# Patient Record
Sex: FEMALE | Race: WHITE | NOT HISPANIC OR LATINO | URBAN - METROPOLITAN AREA
[De-identification: names, ages, dates, MRNs, and addresses within clinical notes are randomized per-mention and may not be internally consistent; named-entity substitution may affect disease eponyms.]

---

## 2023-03-08 DIAGNOSIS — F32.0 DEPRESSION, MAJOR, SINGLE EPISODE, MILD (CMS-HCC): ICD-10-CM

## 2023-03-08 RX ORDER — SERTRALINE HYDROCHLORIDE 50 MG/1
50 TABLET, FILM COATED ORAL DAILY
Qty: 90 TABLET | Refills: 1 | Status: SHIPPED | OUTPATIENT
Start: 2023-03-08 | End: 2023-09-11 | Stop reason: SDUPTHER

## 2023-03-08 RX ORDER — SERTRALINE HYDROCHLORIDE 50 MG/1
50 TABLET, FILM COATED ORAL DAILY
COMMUNITY
End: 2023-03-08 | Stop reason: SDUPTHER

## 2023-03-22 PROBLEM — R03.0 ELEVATED BLOOD PRESSURE, SITUATIONAL: Status: ACTIVE | Noted: 2023-03-22

## 2023-03-22 PROBLEM — E66.01 CLASS 2 SEVERE OBESITY WITH SERIOUS COMORBIDITY AND BODY MASS INDEX (BMI) OF 38.0 TO 38.9 IN ADULT (MULTI): Status: ACTIVE | Noted: 2023-03-22

## 2023-03-22 PROBLEM — Z85.3 HISTORY OF BREAST CANCER: Status: ACTIVE | Noted: 2023-03-22

## 2023-03-22 PROBLEM — M25.532 LEFT WRIST PAIN: Status: ACTIVE | Noted: 2023-03-22

## 2023-03-22 PROBLEM — M17.11 PRIMARY OSTEOARTHRITIS OF RIGHT KNEE: Status: ACTIVE | Noted: 2023-03-22

## 2023-03-22 PROBLEM — M25.579 ANKLE PAIN: Status: ACTIVE | Noted: 2023-03-22

## 2023-03-22 PROBLEM — M25.561 RIGHT KNEE PAIN: Status: ACTIVE | Noted: 2023-03-22

## 2023-03-22 PROBLEM — C50.919 BREAST CA (MULTI): Status: ACTIVE | Noted: 2023-03-22

## 2023-03-22 PROBLEM — M77.50 ANKLE TENDINITIS: Status: ACTIVE | Noted: 2023-03-22

## 2023-03-22 PROBLEM — E11.9 TYPE 2 DIABETES MELLITUS WITHOUT COMPLICATION, WITHOUT LONG-TERM CURRENT USE OF INSULIN (MULTI): Status: ACTIVE | Noted: 2023-03-22

## 2023-03-22 RX ORDER — MELOXICAM 15 MG/1
1 TABLET ORAL DAILY PRN
COMMUNITY
Start: 2022-08-31 | End: 2023-12-07

## 2023-03-22 RX ORDER — ATORVASTATIN CALCIUM 40 MG/1
1 TABLET, FILM COATED ORAL NIGHTLY
COMMUNITY
Start: 2022-09-07 | End: 2023-09-11 | Stop reason: ALTCHOICE

## 2023-03-22 RX ORDER — OMEPRAZOLE 20 MG/1
1 TABLET, DELAYED RELEASE ORAL DAILY
COMMUNITY

## 2023-03-22 RX ORDER — METFORMIN HYDROCHLORIDE 1000 MG/1
1000 TABLET ORAL DAILY
COMMUNITY
Start: 2022-10-24 | End: 2023-09-11 | Stop reason: SDUPTHER

## 2023-04-10 ENCOUNTER — APPOINTMENT (OUTPATIENT)
Dept: PRIMARY CARE | Facility: CLINIC | Age: 63
End: 2023-04-10
Payer: COMMERCIAL

## 2023-09-11 ENCOUNTER — OFFICE VISIT (OUTPATIENT)
Dept: PRIMARY CARE | Facility: CLINIC | Age: 63
End: 2023-09-11
Payer: COMMERCIAL

## 2023-09-11 ENCOUNTER — LAB (OUTPATIENT)
Dept: LAB | Facility: LAB | Age: 63
End: 2023-09-11
Payer: COMMERCIAL

## 2023-09-11 VITALS
BODY MASS INDEX: 34.32 KG/M2 | HEIGHT: 65 IN | RESPIRATION RATE: 18 BRPM | SYSTOLIC BLOOD PRESSURE: 152 MMHG | TEMPERATURE: 98.1 F | OXYGEN SATURATION: 97 % | DIASTOLIC BLOOD PRESSURE: 90 MMHG | HEART RATE: 97 BPM | WEIGHT: 206 LBS

## 2023-09-11 DIAGNOSIS — E11.9 TYPE 2 DIABETES MELLITUS WITHOUT COMPLICATION, WITHOUT LONG-TERM CURRENT USE OF INSULIN (MULTI): Primary | ICD-10-CM

## 2023-09-11 DIAGNOSIS — E11.9 TYPE 2 DIABETES MELLITUS WITHOUT COMPLICATION, WITHOUT LONG-TERM CURRENT USE OF INSULIN (MULTI): ICD-10-CM

## 2023-09-11 DIAGNOSIS — Z12.11 ENCOUNTER FOR SCREENING FOR MALIGNANT NEOPLASM OF COLON: ICD-10-CM

## 2023-09-11 DIAGNOSIS — F32.0 DEPRESSION, MAJOR, SINGLE EPISODE, MILD (CMS-HCC): ICD-10-CM

## 2023-09-11 PROBLEM — F41.8 ANXIETY WITH DEPRESSION: Status: ACTIVE | Noted: 2023-09-11

## 2023-09-11 PROBLEM — E55.9 VITAMIN D DEFICIENCY: Status: ACTIVE | Noted: 2023-09-11

## 2023-09-11 PROBLEM — G62.9 NEUROPATHY: Status: RESOLVED | Noted: 2023-09-11 | Resolved: 2023-09-11

## 2023-09-11 LAB
ALANINE AMINOTRANSFERASE (SGPT) (U/L) IN SER/PLAS: 23 U/L (ref 7–45)
ALBUMIN (G/DL) IN SER/PLAS: 4.3 G/DL (ref 3.4–5)
ALKALINE PHOSPHATASE (U/L) IN SER/PLAS: 60 U/L (ref 33–136)
ANION GAP IN SER/PLAS: 13 MMOL/L (ref 10–20)
ASPARTATE AMINOTRANSFERASE (SGOT) (U/L) IN SER/PLAS: 23 U/L (ref 9–39)
BILIRUBIN TOTAL (MG/DL) IN SER/PLAS: 0.5 MG/DL (ref 0–1.2)
CALCIUM (MG/DL) IN SER/PLAS: 9.7 MG/DL (ref 8.6–10.3)
CARBON DIOXIDE, TOTAL (MMOL/L) IN SER/PLAS: 28 MMOL/L (ref 21–32)
CHLORIDE (MMOL/L) IN SER/PLAS: 101 MMOL/L (ref 98–107)
CHOLESTEROL (MG/DL) IN SER/PLAS: 201 MG/DL (ref 0–199)
CHOLESTEROL IN HDL (MG/DL) IN SER/PLAS: 48.2 MG/DL
CHOLESTEROL/HDL RATIO: 4.2
CREATININE (MG/DL) IN SER/PLAS: 0.76 MG/DL (ref 0.5–1.05)
ERYTHROCYTE DISTRIBUTION WIDTH (RATIO) BY AUTOMATED COUNT: 13.3 % (ref 11.5–14.5)
ERYTHROCYTE MEAN CORPUSCULAR HEMOGLOBIN CONCENTRATION (G/DL) BY AUTOMATED: 31.7 G/DL (ref 32–36)
ERYTHROCYTE MEAN CORPUSCULAR VOLUME (FL) BY AUTOMATED COUNT: 89 FL (ref 80–100)
ERYTHROCYTES (10*6/UL) IN BLOOD BY AUTOMATED COUNT: 4.88 X10E12/L (ref 4–5.2)
GFR FEMALE: 88 ML/MIN/1.73M2
GLUCOSE (MG/DL) IN SER/PLAS: 180 MG/DL (ref 74–99)
HEMATOCRIT (%) IN BLOOD BY AUTOMATED COUNT: 43.5 % (ref 36–46)
HEMOGLOBIN (G/DL) IN BLOOD: 13.8 G/DL (ref 12–16)
LDL: 115 MG/DL (ref 0–99)
LEUKOCYTES (10*3/UL) IN BLOOD BY AUTOMATED COUNT: 4.4 X10E9/L (ref 4.4–11.3)
PLATELETS (10*3/UL) IN BLOOD AUTOMATED COUNT: 200 X10E9/L (ref 150–450)
POC HEMOGLOBIN A1C: 9.8 % (ref 4.2–6.5)
POTASSIUM (MMOL/L) IN SER/PLAS: 4.4 MMOL/L (ref 3.5–5.3)
PROTEIN TOTAL: 7.4 G/DL (ref 6.4–8.2)
SODIUM (MMOL/L) IN SER/PLAS: 138 MMOL/L (ref 136–145)
TRIGLYCERIDE (MG/DL) IN SER/PLAS: 187 MG/DL (ref 0–149)
UREA NITROGEN (MG/DL) IN SER/PLAS: 14 MG/DL (ref 6–23)
VLDL: 37 MG/DL (ref 0–40)

## 2023-09-11 PROCEDURE — 80061 LIPID PANEL: CPT

## 2023-09-11 PROCEDURE — 82570 ASSAY OF URINE CREATININE: CPT

## 2023-09-11 PROCEDURE — 36415 COLL VENOUS BLD VENIPUNCTURE: CPT

## 2023-09-11 PROCEDURE — 3080F DIAST BP >= 90 MM HG: CPT

## 2023-09-11 PROCEDURE — 3077F SYST BP >= 140 MM HG: CPT

## 2023-09-11 PROCEDURE — 83036 HEMOGLOBIN GLYCOSYLATED A1C: CPT

## 2023-09-11 PROCEDURE — 99396 PREV VISIT EST AGE 40-64: CPT

## 2023-09-11 PROCEDURE — 82043 UR ALBUMIN QUANTITATIVE: CPT

## 2023-09-11 PROCEDURE — 80053 COMPREHEN METABOLIC PANEL: CPT

## 2023-09-11 PROCEDURE — 85027 COMPLETE CBC AUTOMATED: CPT

## 2023-09-11 PROCEDURE — 1036F TOBACCO NON-USER: CPT

## 2023-09-11 RX ORDER — INSULIN PUMP SYRINGE, 3 ML
1 EACH MISCELLANEOUS 3 TIMES DAILY
Qty: 1 EACH | Refills: 0 | Status: SHIPPED | OUTPATIENT
Start: 2023-09-11 | End: 2024-09-10

## 2023-09-11 RX ORDER — PRAVASTATIN SODIUM 40 MG/1
40 TABLET ORAL DAILY
Qty: 30 TABLET | Refills: 11 | Status: SHIPPED | OUTPATIENT
Start: 2023-09-11 | End: 2024-09-10

## 2023-09-11 RX ORDER — SERTRALINE HYDROCHLORIDE 50 MG/1
50 TABLET, FILM COATED ORAL DAILY
Qty: 360 TABLET | Refills: 0 | Status: SHIPPED | OUTPATIENT
Start: 2023-09-11 | End: 2024-05-28 | Stop reason: SDUPTHER

## 2023-09-11 RX ORDER — BLOOD SUGAR DIAGNOSTIC
1 STRIP MISCELLANEOUS 3 TIMES DAILY
Qty: 460 STRIP | Refills: 0 | Status: SHIPPED | OUTPATIENT
Start: 2023-09-11 | End: 2023-09-12

## 2023-09-11 RX ORDER — LANCETS
1 EACH MISCELLANEOUS
Qty: 100 EACH | Refills: 3 | Status: SHIPPED | OUTPATIENT
Start: 2023-09-11 | End: 2024-03-09

## 2023-09-11 RX ORDER — METFORMIN HYDROCHLORIDE 1000 MG/1
1000 TABLET ORAL
Qty: 180 TABLET | Refills: 0 | Status: SHIPPED | OUTPATIENT
Start: 2023-09-11 | End: 2024-02-06 | Stop reason: SDUPTHER

## 2023-09-11 ASSESSMENT — ENCOUNTER SYMPTOMS
NERVOUS/ANXIOUS: 0
DIARRHEA: 0
SLEEP DISTURBANCE: 0
CHILLS: 0
DIFFICULTY URINATING: 0
SHORTNESS OF BREATH: 0
COUGH: 0
ABDOMINAL PAIN: 0
ACTIVITY CHANGE: 0
WEAKNESS: 0
PALPITATIONS: 0
APPETITE CHANGE: 0
CONSTIPATION: 0
ARTHRALGIAS: 1
RHINORRHEA: 0
FEVER: 0
HEADACHES: 0

## 2023-09-11 NOTE — PATIENT INSTRUCTIONS
It was nice seeing you in the office today.  Sign up for [facility] to get results instantly.  Increase metform in 2000mg night, statin at night as well  Sugars at least 2 times/day , one fasting.  BP check 2-3x/week.    Obtain labs.  Follow up 4-6 weeks.  Call the office: [phone] for questions or concerns.  Please allow 48-72 hours for medication refills.

## 2023-09-11 NOTE — PROGRESS NOTES
Subjective   Magi Bishop is a 63 y.o. female who presents for Med Refill (Medication refills.) and Follow-up (Follow up DM).  Med Refill  Associated symptoms include arthralgias (knees). Pertinent negatives include no abdominal pain, chest pain, chills, congestion, coughing, fever, headaches or weakness.     No concerns today.    Last physical: last year  Last blood work: last year.  Current meds: Reviewed and UTD.  Immunizations: UTD on Tdap, and shingles.    Tobacco screen: never.    Diet: Veggies, smaller portions. Limiting carbs.  Exercise: life works, walking with .    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    Colonoscopy: never done.  Mammogram: UTD Pap: UTD.  Post menopausal.  S/p Chemo, radiation, lumpectomy for breast cancer.    Active Problem List  Depression - controlled with Sertraline.  T2DM - uncontrolled - metformin.  Right OA - controlled with Excedrin.    Comprehensive Medical/Surgical/Social/Family History  Past Medical History:   Diagnosis Date    Personal history of malignant neoplasm of breast 02/07/2022    History of malignant neoplasm of breast     Past Surgical History:   Procedure Laterality Date    OTHER SURGICAL HISTORY  02/07/2022    Wrist fracture repair    OTHER SURGICAL HISTORY  02/07/2022    Breast surgery     Social History     Social History Narrative    Not on file     Allergies and Medications  Patient has no known allergies.  Current Outpatient Medications on File Prior to Visit   Medication Sig Dispense Refill    meloxicam (Mobic) 15 mg tablet Take 1 tablet (15 mg) by mouth once daily as needed.      omeprazole OTC (PriLOSEC OTC) 20 mg EC tablet Take 1 tablet (20 mg) by mouth once daily.      [DISCONTINUED] metFORMIN (Glucophage) 1,000 mg tablet Take 1 tablet (1,000 mg) by mouth once daily. Take with food.      [DISCONTINUED] sertraline (Zoloft) 50 mg tablet Take 1 tablet (50 mg) by mouth once daily. 90 tablet 1     "[DISCONTINUED] atorvastatin (Lipitor) 40 mg tablet Take 1 tablet (40 mg) by mouth once daily at bedtime.       No current facility-administered medications on file prior to visit.     Review of Systems   Constitutional:  Negative for activity change, appetite change, chills and fever.   HENT:  Negative for congestion and rhinorrhea.    Eyes:  Negative for visual disturbance.   Respiratory:  Negative for cough and shortness of breath.    Cardiovascular:  Negative for chest pain, palpitations and leg swelling.   Gastrointestinal:  Negative for abdominal pain, constipation and diarrhea.   Endocrine: Negative for polyuria.   Genitourinary:  Negative for difficulty urinating.   Musculoskeletal:  Positive for arthralgias (knees).   Neurological:  Negative for weakness and headaches.   Psychiatric/Behavioral:  Negative for sleep disturbance. The patient is not nervous/anxious.      /90 (BP Location: Right arm, Patient Position: Sitting)   Pulse 97   Temp 36.7 °C (98.1 °F)   Resp 18   Ht 1.651 m (5' 5\")   Wt 93.4 kg (206 lb)   SpO2 97%   BMI 34.28 kg/m²     Objective     Physical Exam  Vitals reviewed.   Constitutional:       General: She is not in acute distress.     Appearance: Normal appearance. She is obese.   HENT:      Head: Normocephalic and atraumatic.      Right Ear: Tympanic membrane, ear canal and external ear normal.      Left Ear: Tympanic membrane, ear canal and external ear normal.      Mouth/Throat:      Mouth: Mucous membranes are moist.      Pharynx: Oropharynx is clear.   Eyes:      Extraocular Movements: Extraocular movements intact.      Pupils: Pupils are equal, round, and reactive to light.   Cardiovascular:      Rate and Rhythm: Normal rate and regular rhythm.      Pulses: Normal pulses.      Heart sounds: Normal heart sounds. No murmur heard.  Pulmonary:      Effort: Pulmonary effort is normal.      Breath sounds: Normal breath sounds. No wheezing, rhonchi or rales.   Abdominal:      " General: Bowel sounds are normal.      Palpations: Abdomen is soft.      Tenderness: There is no abdominal tenderness. There is no guarding or rebound.   Musculoskeletal:         General: Normal range of motion.      Cervical back: Normal range of motion and neck supple. No tenderness.      Right lower leg: No edema.      Left lower leg: No edema.   Skin:     General: Skin is warm and dry.      Capillary Refill: Capillary refill takes less than 2 seconds.   Neurological:      General: No focal deficit present.      Mental Status: She is alert.      Cranial Nerves: No cranial nerve deficit.      Motor: No weakness.      Gait: Gait normal.      Deep Tendon Reflexes: Reflexes normal.   Psychiatric:         Mood and Affect: Mood normal.       Assessment/Plan   Problem List Items Addressed This Visit       Depression, major, single episode, mild (CMS/HCC)    Relevant Medications    sertraline (Zoloft) 50 mg tablet    Type 2 diabetes mellitus without complication, without long-term current use of insulin (CMS/HCC) - Primary    Relevant Medications    metFORMIN (Glucophage) 1,000 mg tablet    FreeStyle glucose monitoring kit    blood sugar diagnostic (FreeStyle Test) strip    lancets misc    pravastatin (Pravachol) 40 mg tablet    Other Relevant Orders    POCT glycosylated hemoglobin (Hb A1C) manually resulted (Completed)    CBC    Comprehensive metabolic panel    Lipid panel    Albumin, urine, random     Other Visit Diagnoses       Encounter for screening for malignant neoplasm of colon        Relevant Orders    Colonoscopy Screening        Pleasant 63-year-old female here for physical.  She has active problems of type 2 diabetes as currently uncontrolled on metformin 1000 mg at night.  Discussed maximizing at 2000 mg at night as well as secondary agent, however in shared decision making, patient would like to trial 2000 mg and check sugars and work on diet before considering another agent.  She does have an ASCVD risk  score of 11 and pravastatin 40 was added to her regimen.  Depression is currently controlled with Zoloft 2 mg at this time.  Her blood pressure was elevated in the office today, she does have a history of situational elevated blood pressure.  She is not checking blood pressures at home, would recommend checking blood pressures 2-3 times per week and following up with logs in 4 to 6 weeks.  Lab work and screenings ordered per above.  Immunizations up-to-date.  Recommend flu shot.  Recommend:  Seeing dentist twice a year.  Getting eyes checked yearly.  30 minutes of moderate intensity exercise 5x/week.  Application of sun screen every 2 hours during peak times.  Regular sleep schedule, at least 8 hours of sleep per night, limiting TV/screens 30 minutes prior to bedtime.    Discussed with Attending,    Siena Sheffield, DO PGY-3

## 2023-09-12 LAB
ALBUMIN (MG/L) IN URINE: 25.9 MG/L
ALBUMIN/CREATININE (UG/MG) IN URINE: 13 UG/MG CRT (ref 0–30)
CREATININE (MG/DL) IN URINE: 199 MG/DL (ref 20–320)

## 2023-09-12 RX ORDER — BLOOD SUGAR DIAGNOSTIC
1 STRIP MISCELLANEOUS 4 TIMES DAILY
Qty: 460 STRIP | Refills: 0 | Status: SHIPPED | OUTPATIENT
Start: 2023-09-12 | End: 2024-03-10

## 2023-09-12 NOTE — PROGRESS NOTES
I reviewed with the resident the medical history and the resident’s findings on physical examination.  I discussed with the resident the patient’s diagnosis and concur with the treatment plan as documented in the resident note.   Patient is doing fine no chest pain or shortness of breath.  Working to readjust some of her medications get blood work.  Patient can follow-up in 1 month  Side effects of medication with [place] to the patient  If any chest pain shortness of breath any nausea vomit diarrhea fever headache any concerning symptoms go to the ER  Follow-up in 1 month  Agree with assessment and plan    Madi López, DO    This note was done using voice fraction and software is not checked for any spelling or grammar errors

## 2023-09-12 NOTE — RESULT ENCOUNTER NOTE
Please notify Magi of normal red cells, white cells, electrolytes, kidney function, liver function. No protein in urine which is good. Cholesterol improved from year prior. Monitor blood sugars and BP and keep 4-6 week follow up.     Thank you.

## 2023-10-25 ENCOUNTER — OFFICE VISIT (OUTPATIENT)
Dept: PRIMARY CARE | Facility: CLINIC | Age: 63
End: 2023-10-25
Payer: COMMERCIAL

## 2023-10-25 VITALS
WEIGHT: 201 LBS | OXYGEN SATURATION: 97 % | SYSTOLIC BLOOD PRESSURE: 156 MMHG | HEIGHT: 65 IN | TEMPERATURE: 97.8 F | DIASTOLIC BLOOD PRESSURE: 106 MMHG | HEART RATE: 90 BPM | BODY MASS INDEX: 33.49 KG/M2 | RESPIRATION RATE: 18 BRPM

## 2023-10-25 DIAGNOSIS — I10 ESSENTIAL HYPERTENSION: Primary | ICD-10-CM

## 2023-10-25 DIAGNOSIS — Z12.31 SCREENING MAMMOGRAM FOR BREAST CANCER: ICD-10-CM

## 2023-10-25 DIAGNOSIS — Z01.419 WELL WOMAN EXAM: ICD-10-CM

## 2023-10-25 DIAGNOSIS — E11.9 ENCOUNTER FOR DIABETIC FOOT EXAM (MULTI): ICD-10-CM

## 2023-10-25 DIAGNOSIS — E11.9 TYPE 2 DIABETES MELLITUS WITHOUT COMPLICATION, WITHOUT LONG-TERM CURRENT USE OF INSULIN (MULTI): ICD-10-CM

## 2023-10-25 PROCEDURE — 3080F DIAST BP >= 90 MM HG: CPT

## 2023-10-25 PROCEDURE — 99214 OFFICE O/P EST MOD 30 MIN: CPT

## 2023-10-25 PROCEDURE — 88175 CYTOPATH C/V AUTO FLUID REDO: CPT

## 2023-10-25 PROCEDURE — 1036F TOBACCO NON-USER: CPT

## 2023-10-25 PROCEDURE — 87624 HPV HI-RISK TYP POOLED RSLT: CPT

## 2023-10-25 PROCEDURE — 3077F SYST BP >= 140 MM HG: CPT

## 2023-10-25 PROCEDURE — 4010F ACE/ARB THERAPY RXD/TAKEN: CPT

## 2023-10-25 RX ORDER — LISINOPRIL 20 MG/1
20 TABLET ORAL DAILY
Qty: 90 TABLET | Refills: 0 | Status: SHIPPED | OUTPATIENT
Start: 2023-10-25 | End: 2024-02-06 | Stop reason: SDUPTHER

## 2023-10-25 RX ORDER — 5HYDROXYTRYPTOPHAN(OXITRIPTAN) 200 MG
CAPSULE ORAL
COMMUNITY

## 2023-10-25 ASSESSMENT — ENCOUNTER SYMPTOMS
FEVER: 0
CHILLS: 0
POLYDIPSIA: 0
PALPITATIONS: 0
SHORTNESS OF BREATH: 0
CHEST TIGHTNESS: 0

## 2023-10-25 NOTE — PROGRESS NOTES
I reviewed with the resident the medical history and the resident’s findings on physical examination.  I discussed with the resident the patient’s diagnosis and concur with the treatment plan as documented in the resident note.     Griffin Díaz, DO

## 2023-10-25 NOTE — PROGRESS NOTES
"Subjective   Magi Bishop is a 63 y.o. female who presents for Follow-up (Follow up on DM and HTN).    HPI    Magi is here for DM and HTN follow up.  DM taking metformin 1000mg BID  She is eating less and trying to eat better.    Limiting carbs, trying to eat more vegetables and fruits.  Joined life works.  Her BP at home low 140-150's/80's.  No BP meds.    Rotating Excedrin and motrin. Only as needed.    Also asking for mammogram and and Pap smear.  Unsure when her last mammogram was.  She does have history of breast cancer.  Pap smear has been greater than 5 years.    Review of Systems   Constitutional:  Negative for chills and fever.   Eyes:  Negative for visual disturbance.   Respiratory:  Negative for chest tightness and shortness of breath.    Cardiovascular:  Negative for chest pain, palpitations and leg swelling.   Endocrine: Negative for polydipsia and polyuria.     Objective     BP (!) 156/106 (BP Location: Right arm, Patient Position: Sitting)   Pulse 90   Temp 36.6 °C (97.8 °F)   Resp 18   Ht 1.651 m (5' 5\")   Wt 91.2 kg (201 lb)   SpO2 97%   BMI 33.45 kg/m²   Physical Exam  Vitals reviewed.   Constitutional:       General: She is not in acute distress.     Appearance: Normal appearance.   HENT:      Head: Normocephalic.   Cardiovascular:      Rate and Rhythm: Normal rate and regular rhythm.      Pulses: Normal pulses.           Dorsalis pedis pulses are 2+ on the right side and 2+ on the left side.      Heart sounds: Normal heart sounds. No murmur heard.  Pulmonary:      Effort: Pulmonary effort is normal.      Breath sounds: Normal breath sounds. No wheezing, rhonchi or rales.   Genitourinary:     General: Normal vulva.      Exam position: Lithotomy position.      Labia:         Right: No rash or lesion.         Left: No rash or lesion.       Vagina: Normal. No vaginal discharge or erythema.      Cervix: Normal.      Uterus: Normal. Not enlarged and not tender.       Adnexa: " Right adnexa normal and left adnexa normal.        Right: No mass.          Left: No mass.     Musculoskeletal:      Right foot: Normal range of motion. No deformity.      Left foot: Normal range of motion. No deformity.   Feet:      Right foot:      Protective Sensation: 7 sites tested.  6 sites sensed.      Skin integrity: Skin integrity normal.      Toenail Condition: Right toenails are normal.      Left foot:      Protective Sensation: 7 sites tested.  6 sites sensed.      Skin integrity: Skin integrity normal.      Toenail Condition: Left toenails are normal.   Skin:     General: Skin is warm and dry.   Neurological:      Mental Status: She is alert.   Psychiatric:         Mood and Affect: Mood normal.       Assessment/Plan   Problem List Items Addressed This Visit             ICD-10-CM    Type 2 diabetes mellitus without complication, without long-term current use of insulin (CMS/[facility]) E11.9    Relevant Medications    SITagliptin phosphate (Januvia) 25 mg tablet    Other Relevant Orders    Basic metabolic panel    Follow Up In [facility] - PCP - Established     Other Visit Diagnoses         Codes    Essential hypertension    -  Primary I10    Relevant Medications    lisinopril 20 mg tablet    Other Relevant Orders    Basic metabolic panel    Follow Up In [facility] - PCP - Established    Screening mammogram for breast cancer     Z12.31    Relevant Orders    BI mammo bilateral screening tomosynthesis    Well woman exam     Z01.419    Relevant Orders    THINPREP PAP TEST    Encounter for diabetic foot exam (CMS/[facility])     E11.9        Hypertension: Uncontrolled.  We will start lisinopril 20 mg.  Recheck BMP in 2 months.  Uncontrolled diabetes melitis: Continue metformin 2000 mg daily will add Januvia 25 mg for better glycemic control.  Januvia was chosen because of low cost and once daily dosing without significant side effects.  Diabetic foot exam was unremarkable.  She does have some  decrease sensation to her calcaneus although she does attribute this to side effects from breast cancer chemo.  Discussed being extra cautious with cuts and open wounds on feet given her diabetes.  She is to follow-up in 2 months.  Well woman: Normal exam today.  Pap smear collected and ordered.  Last recorded mammogram was from 2014 and she has a history of breast cancer.  Screening mammogram ordered.  Patient also reporting vaginal dryness during intercourse.  Recommended over-the-counter natural lubricants including coconut based options.  Recommend avoiding warming lubricants or scented lubricants.    Discussed with Attending,    Siena Sheffield, DO PGY-3

## 2023-11-07 LAB
CYTOLOGY CMNT CVX/VAG CYTO-IMP: NORMAL
HPV HR GENOTYPES PNL CVX NAA+PROBE: NEGATIVE
HPV HR GENOTYPES PNL CVX NAA+PROBE: NEGATIVE
HPV16 DNA SPEC QL NAA+PROBE: NEGATIVE
HPV18 DNA SPEC QL NAA+PROBE: NEGATIVE
LAB AP HPV GENOTYPE QUESTION: YES
LAB AP HPV HR: NORMAL
LABORATORY COMMENT REPORT: NORMAL
PATH REPORT.TOTAL CANCER: NORMAL

## 2023-11-08 NOTE — RESULT ENCOUNTER NOTE
Herman Sow,    Your pap smear and HPV testing is normal. You will age out of HPV screening at age 65. You can choose to get another pap at 66 or opt to not get further testing since this results is normal.     Please call the office: [phone] for questions or concerns.    Have a great day!

## 2023-11-13 ENCOUNTER — ANCILLARY PROCEDURE (OUTPATIENT)
Dept: RADIOLOGY | Facility: CLINIC | Age: 63
End: 2023-11-13
Payer: COMMERCIAL

## 2023-11-13 DIAGNOSIS — Z12.31 SCREENING MAMMOGRAM FOR BREAST CANCER: ICD-10-CM

## 2023-11-13 PROCEDURE — 77063 BREAST TOMOSYNTHESIS BI: CPT

## 2023-11-13 PROCEDURE — 77067 SCR MAMMO BI INCL CAD: CPT | Performed by: RADIOLOGY

## 2023-11-13 PROCEDURE — 77063 BREAST TOMOSYNTHESIS BI: CPT | Performed by: RADIOLOGY

## 2023-11-15 NOTE — RESULT ENCOUNTER NOTE
Herman Sow,    Your mammogram is normal. Repeat in 1 year.    Please call the office: [phone] for questions or concerns.    Have a great day!

## 2023-12-07 DIAGNOSIS — M17.11 UNILATERAL PRIMARY OSTEOARTHRITIS, RIGHT KNEE: ICD-10-CM

## 2023-12-07 RX ORDER — MELOXICAM 15 MG/1
15 TABLET ORAL AS NEEDED
Qty: 90 TABLET | Refills: 0 | Status: SHIPPED | OUTPATIENT
Start: 2023-12-07 | End: 2024-03-06

## 2024-01-02 ENCOUNTER — TELEPHONE (OUTPATIENT)
Dept: PRIMARY CARE | Facility: CLINIC | Age: 64
End: 2024-01-02
Payer: COMMERCIAL

## 2024-01-03 DIAGNOSIS — Z12.11 SCREEN FOR COLON CANCER: Primary | ICD-10-CM

## 2024-01-03 RX ORDER — POLYETHYLENE GLYCOL 3350, SODIUM CHLORIDE, SODIUM BICARBONATE, POTASSIUM CHLORIDE 420; 11.2; 5.72; 1.48 G/4L; G/4L; G/4L; G/4L
4000 POWDER, FOR SOLUTION ORAL ONCE
Qty: 4000 ML | Refills: 0 | Status: SHIPPED | OUTPATIENT
Start: 2024-01-03 | End: 2024-01-03

## 2024-01-04 ENCOUNTER — LAB (OUTPATIENT)
Dept: LAB | Facility: LAB | Age: 64
End: 2024-01-04
Payer: COMMERCIAL

## 2024-01-04 DIAGNOSIS — E11.9 TYPE 2 DIABETES MELLITUS WITHOUT COMPLICATION, WITHOUT LONG-TERM CURRENT USE OF INSULIN (MULTI): ICD-10-CM

## 2024-01-04 DIAGNOSIS — I10 ESSENTIAL HYPERTENSION: ICD-10-CM

## 2024-01-04 LAB
ANION GAP SERPL CALC-SCNC: 14 MMOL/L (ref 10–20)
BUN SERPL-MCNC: 12 MG/DL (ref 6–23)
CALCIUM SERPL-MCNC: 9.1 MG/DL (ref 8.6–10.3)
CHLORIDE SERPL-SCNC: 100 MMOL/L (ref 98–107)
CO2 SERPL-SCNC: 27 MMOL/L (ref 21–32)
CREAT SERPL-MCNC: 0.73 MG/DL (ref 0.5–1.05)
GFR SERPL CREATININE-BSD FRML MDRD: >90 ML/MIN/1.73M*2
GLUCOSE SERPL-MCNC: 196 MG/DL (ref 74–99)
POTASSIUM SERPL-SCNC: 4.3 MMOL/L (ref 3.5–5.3)
SODIUM SERPL-SCNC: 137 MMOL/L (ref 136–145)

## 2024-01-04 PROCEDURE — 80048 BASIC METABOLIC PNL TOTAL CA: CPT

## 2024-01-04 PROCEDURE — 36415 COLL VENOUS BLD VENIPUNCTURE: CPT

## 2024-01-05 ENCOUNTER — APPOINTMENT (OUTPATIENT)
Dept: PRIMARY CARE | Facility: CLINIC | Age: 64
End: 2024-01-05
Payer: COMMERCIAL

## 2024-02-01 ENCOUNTER — APPOINTMENT (OUTPATIENT)
Dept: GASTROENTEROLOGY | Facility: HOSPITAL | Age: 64
End: 2024-02-01
Payer: COMMERCIAL

## 2024-02-06 ENCOUNTER — OFFICE VISIT (OUTPATIENT)
Dept: PRIMARY CARE | Facility: CLINIC | Age: 64
End: 2024-02-06
Payer: COMMERCIAL

## 2024-02-06 VITALS
BODY MASS INDEX: 31.64 KG/M2 | HEART RATE: 103 BPM | DIASTOLIC BLOOD PRESSURE: 85 MMHG | RESPIRATION RATE: 16 BRPM | SYSTOLIC BLOOD PRESSURE: 134 MMHG | TEMPERATURE: 97.7 F | OXYGEN SATURATION: 95 % | WEIGHT: 190.13 LBS

## 2024-02-06 DIAGNOSIS — I10 ESSENTIAL HYPERTENSION: ICD-10-CM

## 2024-02-06 DIAGNOSIS — C18.6 CANCER OF DESCENDING COLON (MULTI): ICD-10-CM

## 2024-02-06 DIAGNOSIS — E11.9 TYPE 2 DIABETES MELLITUS WITHOUT COMPLICATION, WITHOUT LONG-TERM CURRENT USE OF INSULIN (MULTI): Primary | ICD-10-CM

## 2024-02-06 PROBLEM — R05.9 COUGH: Status: RESOLVED | Noted: 2024-01-24 | Resolved: 2024-02-06

## 2024-02-06 PROBLEM — R50.9 FEVER: Status: RESOLVED | Noted: 2024-01-24 | Resolved: 2024-02-06

## 2024-02-06 LAB — POC HEMOGLOBIN A1C: 9.3 % (ref 4.2–6.5)

## 2024-02-06 PROCEDURE — 83036 HEMOGLOBIN GLYCOSYLATED A1C: CPT

## 2024-02-06 PROCEDURE — 1036F TOBACCO NON-USER: CPT

## 2024-02-06 PROCEDURE — 4010F ACE/ARB THERAPY RXD/TAKEN: CPT

## 2024-02-06 PROCEDURE — 3075F SYST BP GE 130 - 139MM HG: CPT

## 2024-02-06 PROCEDURE — 3079F DIAST BP 80-89 MM HG: CPT

## 2024-02-06 PROCEDURE — 99214 OFFICE O/P EST MOD 30 MIN: CPT

## 2024-02-06 RX ORDER — LISINOPRIL 20 MG/1
20 TABLET ORAL DAILY
Qty: 90 TABLET | Refills: 0 | Status: SHIPPED | OUTPATIENT
Start: 2024-02-06 | End: 2024-05-06

## 2024-02-06 RX ORDER — METFORMIN HYDROCHLORIDE 1000 MG/1
1000 TABLET ORAL
Qty: 180 TABLET | Refills: 0 | Status: SHIPPED | OUTPATIENT
Start: 2024-02-06 | End: 2024-05-28 | Stop reason: SDUPTHER

## 2024-02-06 ASSESSMENT — ENCOUNTER SYMPTOMS
FEVER: 0
CHILLS: 0
POLYDIPSIA: 0
APPETITE CHANGE: 1
DIFFICULTY URINATING: 0

## 2024-02-06 NOTE — PATIENT INSTRUCTIONS
Follow up with oncology and surgery.    Increase janeuvia to 50mg and start Jaurdiance - might need PA.    Continue to check sugars. Avoid melon type fruits.    Follow up in 4-5 weeks - 2 weeks after your surgery.

## 2024-02-06 NOTE — PROGRESS NOTES
I reviewed with the resident the medical history and the resident’s findings on physical examination.  I discussed with the resident the patient’s diagnosis and concur with the treatment plan as documented in the resident note.     Phillip Foreman, DO

## 2024-02-06 NOTE — PROGRESS NOTES
Subjective   Magi Hurt is a 63 y.o. female who presents for Results (Pt here today to discuss recent Dx of colon cancer).    HPI    Magi is here for ED follow up from [facility]. She was seen for fever and after her colonoscopy. CT scan showed necrotic mass in the descending colon measuring [id number] extending into the lateral oblique muscle.    She is feeling well, but does have some fatigue and increased somnolence.  She is some what depressed at her diagnosis but optimistic.     She has appointment with Manny (oncology) on 2/12. Pending surgery on 2/28 for partial colectomy and ? Loop ileostomy wt Dr. Valles (surgery) at [facility].    She is also here for diabetes T2. She is on Metformin 2000mg, januvia 25mg.  Fasting is 100-110. Evening sugars are 140's. However sugars in ED were 300. Appetite is down. She does like to snack on her melon type foods like watermelon.  No symptoms of high blood sugar.    Review of Systems   Constitutional:  Positive for appetite change (decreased). Negative for chills and fever.   Endocrine: Negative for polydipsia and polyuria.   Genitourinary:  Negative for difficulty urinating.     Objective     /85 (BP Location: Right arm, Patient Position: Sitting)   Pulse 103   Temp 36.5 °C (97.7 °F) (Temporal)   Resp 16   Wt 86.2 kg (190 lb 2 oz)   SpO2 95%   BMI 31.64 kg/m²     Physical Exam  Vitals reviewed.   Constitutional:       General: She is not in acute distress.     Appearance: Normal appearance.   HENT:      Head: Normocephalic.   Cardiovascular:      Rate and Rhythm: Normal rate and regular rhythm.      Pulses: Normal pulses.      Heart sounds: Normal heart sounds. No murmur heard.  Pulmonary:      Effort: Pulmonary effort is normal.      Breath sounds: Normal breath sounds. No wheezing, rhonchi or rales.   Musculoskeletal:      Right lower leg: No edema.      Left lower leg: No edema.   Skin:     General: Skin is warm and dry.   Neurological:      Mental  Status: She is alert.   Psychiatric:         Mood and Affect: Mood normal.       Orders Placed This Encounter   Procedures    POCT glycosylated hemoglobin (Hb A1C) manually resulted     Order Specific Question:   Release result to [facility]     Answer:   Immediate [1]      Assessment/Plan   Problem List Items Addressed This Visit             ICD-10-CM    Type 2 diabetes mellitus without complication, without long-term current use of insulin (CMS/[facility]) - Primary E11.9    Relevant Medications    SITagliptin phosphate (Januvia) 50 mg tablet    empagliflozin (Jardiance) 10 mg    metFORMIN (Glucophage) 1,000 mg tablet    Other Relevant Orders    POCT glycosylated hemoglobin (Hb A1C) manually resulted (Completed)    Cancer of descending colon (CMS/[facility]) C18.6     Other Visit Diagnoses         Codes    Essential hypertension     I10    Relevant Medications    lisinopril 20 mg tablet        Magi is here for ED follow-up.  She was recently diagnosed with stage I adenocarcinoma of the colon.  She has appropriate follow-up with surgery and oncology.    Type 2 diabetes: Uncontrolled today with A1c of 9.3%.  She is currently on max dose metformin, will increase Januvia to 50 mg daily and add Jardiance 10 mg.  Discussed side effects and the importance of staying hydrated.  Patient was not interested in injectables at this time.  She is to continue to check her sugars.  Given she is still uncontrolled, will have her follow-up in 4 to 6 weeks.  She is getting surgery at the end of this month. Will have her follow-up 2  weeks after her surgery.    Hypertension: Controlled on lisinopril 20.  Continue.    Discussed with Attending,    Siena Sheffield, DO PGY-3

## 2024-02-29 ENCOUNTER — TELEPHONE (OUTPATIENT)
Dept: PRIMARY CARE | Facility: CLINIC | Age: 64
End: 2024-02-29
Payer: COMMERCIAL

## 2024-05-28 ENCOUNTER — TELEPHONE (OUTPATIENT)
Dept: PRIMARY CARE | Facility: CLINIC | Age: 64
End: 2024-05-28
Payer: COMMERCIAL

## 2024-05-28 DIAGNOSIS — E11.9 TYPE 2 DIABETES MELLITUS WITHOUT COMPLICATION, WITHOUT LONG-TERM CURRENT USE OF INSULIN (MULTI): ICD-10-CM

## 2024-05-28 DIAGNOSIS — F32.0 DEPRESSION, MAJOR, SINGLE EPISODE, MILD (CMS-HCC): ICD-10-CM

## 2024-05-28 RX ORDER — SERTRALINE HYDROCHLORIDE 50 MG/1
50 TABLET, FILM COATED ORAL DAILY
Qty: 360 TABLET | Refills: 0 | Status: SHIPPED | OUTPATIENT
Start: 2024-05-28 | End: 2025-05-28

## 2024-05-28 RX ORDER — METFORMIN HYDROCHLORIDE 1000 MG/1
1000 TABLET ORAL
Qty: 180 TABLET | Refills: 0 | Status: SHIPPED | OUTPATIENT
Start: 2024-05-28 | End: 2024-08-26

## 2024-05-28 NOTE — TELEPHONE ENCOUNTER
Rx Refill Request Telephone Encounter    Name:  Magi Hurt  :  [id number]  Medication Name:      SITagliptin phosphate (Januvia) 50 mg table     empagliflozin (Jardiance) 10 mg     metFORMIN (Glucophage) 1,000 mg tablet     sertraline (Zoloft) 50 mg tablet     Specific Pharmacy location:    [facility] (CURRENTLY IN [place])    Date of last appointment:    Date of next appointment:    Best number to reach patient: